# Patient Record
Sex: FEMALE | Race: WHITE
[De-identification: names, ages, dates, MRNs, and addresses within clinical notes are randomized per-mention and may not be internally consistent; named-entity substitution may affect disease eponyms.]

---

## 2018-04-30 ENCOUNTER — HOSPITAL ENCOUNTER (EMERGENCY)
Dept: HOSPITAL 45 - C.EDB | Age: 28
Discharge: HOME | End: 2018-04-30
Payer: COMMERCIAL

## 2018-04-30 VITALS
HEIGHT: 60.98 IN | BODY MASS INDEX: 55.32 KG/M2 | BODY MASS INDEX: 55.32 KG/M2 | WEIGHT: 293 LBS | WEIGHT: 293 LBS | HEIGHT: 60.98 IN

## 2018-04-30 VITALS — DIASTOLIC BLOOD PRESSURE: 79 MMHG | SYSTOLIC BLOOD PRESSURE: 132 MMHG | HEART RATE: 89 BPM | OXYGEN SATURATION: 98 %

## 2018-04-30 VITALS — TEMPERATURE: 98.06 F

## 2018-04-30 DIAGNOSIS — F31.9: ICD-10-CM

## 2018-04-30 DIAGNOSIS — F17.210: ICD-10-CM

## 2018-04-30 DIAGNOSIS — Z82.49: ICD-10-CM

## 2018-04-30 DIAGNOSIS — Z83.3: ICD-10-CM

## 2018-04-30 DIAGNOSIS — Z80.9: ICD-10-CM

## 2018-04-30 DIAGNOSIS — E66.01: ICD-10-CM

## 2018-04-30 DIAGNOSIS — S52.502B: Primary | ICD-10-CM

## 2018-04-30 DIAGNOSIS — Z79.899: ICD-10-CM

## 2018-04-30 DIAGNOSIS — Z90.49: ICD-10-CM
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
## 2018-04-30 NOTE — DIAGNOSTIC IMAGING REPORT
L HAND MIN 3 VIEWS ROUTINE



CLINICAL HISTORY: Dog bites/L wrist/hand pain trauma. Pain.



COMPARISON: None.



DISCUSSION: Focal fracture distal radial metaphysis remaining osseous structures

are intact. Generalized soft tissue edema and disruption.    



IMPRESSION: 

1. Generalized soft tissue edema/disruption.





2. Focal nondisplaced cortical fracture distal radial metaphysis











The above report was generated using voice recognition software.  It may contain

grammatical, syntax or spelling errors.







Electronically signed by:  Adriano Santiago M.D.

4/30/2018 8:07 PM



Dictated Date/Time:  4/30/2018 8:05 PM

## 2018-04-30 NOTE — EMERGENCY ROOM VISIT NOTE
History


First contact with patient:  18:59


Chief Complaint:  BITE


Stated Complaint:  BITE DOG





History of Present Illness


The patient is a 27 year old female who presents to the Emergency Room with 

complaints of a dog bite to the left wrist/hand region.  The injury happened 

approximately 1 hour ago.  The patient reports that she cannot move the wrist 

or fingers because of pain.  She rates her discomfort a 10 out of 10.  She 

reports that the dog belongs to a friend of her friend.  It is a pit bull, and 

has been aggressive in the past.  They did try to contact the owner to see if 

the dog was up-to-date on its shots, but was unsuccessful.  The patient's 

tetanus immunization is up-to-date.  The patient is right-hand dominant.





Review of Systems


HEENT:  Denies dizziness, visual problems, hearing loss, tinnitus.  Denies 

difficulty swallowing or oral lesions.


PULMONARY: Denies cough, shortness of breath, sputum production or hemoptysis.


CARDIOVASCULAR:  Denies chest pain, palpitations, dyspnea on exertion, 

orthopnea or peripheral edema.


GASTROINTESTINAL:  Denies diarrhea, constipation, nausea, vomiting, or 

abdominal pain.


GENITOURINARY:  Denies dysuria, frequency, urgency or nocturia.


NEUROLOGIC:  Denies history of epilepsy, CVA, TIA or chronic headaches.


MUSCULOSKELETAL: Denies history of joint tenderness/swelling.


SKIN:  Denies rashes or lesions.


PSYCHIATRIC: Denies history of depression or mental illness.


ENDOCRINE:  Denies history of diabetes or thyroid disorders.





Past Medical/Surgical History


Medical Problems:


(1) Biliary colic


(2) Bipolar disorder


Surgical Problems:


(1) History of cholecystectomy








Family History





Cancer


Diabetes mellitus


FH: heart disease


Hypertension





Social History


Smoking Status:  Current Every Day Smoker


Alcohol Use:  none


Drug Use:  none


Marital Status:  in relationship


Housing Status:  lives with significant other


Occupation Status:  employed





Current/Historical Medications


Scheduled


Ciprofloxacin Hcl (Cipro), 500 MG PO BID


Clindamycin Hcl (Cleocin), 300 MG PO TID


Omeprazole (Prilosec), 20 MG PO DAILY





Scheduled PRN


Oxycodone Ir (Roxicodone Ir), 1-2 TAB PO Q4H PRN for Pain





Miscellaneous Medications


Hydroxyzine HCl (Hydroxyzine HCl)





Physical Exam


Vital Signs











  Date Time  Temp Pulse Resp B/P (MAP) Pulse Ox O2 Delivery O2 Flow Rate FiO2


 


4/30/18 21:32  89 19 132/79 98   


 


4/30/18 20:12  86 20 134/71 98 Room Air  


 


4/30/18 18:55 36.7 93 24 146/98 98 Room Air  











Physical Exam


CONSTITUTIONAL: Morbidly obese female, alert and oriented X 3 with positive 

affect.  Patient is crying, requesting Arsh for pain.


HEENT:  Normocephalic, atraumatic.  Pupils equal, round and reactive.  


NECK:  Full active range of motion without discomfort.


RESPIRATORY:  Clear to auscultation bilaterally with no wheezing, crackles, 

rhonchi or stridor.


CARDIOVASCULAR:  Regular rate and rhythm with no murmurs, rubs or gallops.


GASTROINTESTINAL:  Bowel sounds present in all quadrants.  


MUSCULOSKELETAL: The patient initially refused any examination until she got 

something for the pain.  After she was administered parenteral analgesics, the 

dressing was removed from the left wrist and hand region to show several 

puncture wounds.  There are no lacerations that will require gross 

approximation with suturing.  The patient has discomfort with any range of 

motion of the wrist and fingers.  Moderate edema is noted.  Capillary refill of 

the fingers is less than 2 seconds.


INTEGUMENTARY:  No rash or other significant dermatologic conditions noted.


NEUROLOGIC: Left hand and fingers are sensory intact.





Medical Decision & Procedures


ER Provider


Diagnostic Interpretation:


My interpretation of left wrist and hand x-rays shows a focal cortical fracture 

of the distal dorsal radius.  Radiologist report is as follows:





L WRIST MIN 3 VIEWS ROUTINE





CLINICAL HISTORY: Dog bites/L wrist/hand pain trauma. Pain.





COMPARISON: None.





DISCUSSION: A focal cortical fracture distal radial metaphysis. The remaining


osseous structures of the wrist are unremarkable. Generalized soft tissue edema


potential small minimally radiopaque density dorsal to the distal radius which


may represent a small avulsion versus small foreign body.    





IMPRESSION: 


1. Focal fracture distal radial metaphysis.


2. Potential tiny avulsion of the dorsal to the distal radius.








3. Generalized soft tissue edema and disruption.





Medications Administered











 Medications


  (Trade)  Dose


 Ordered  Sig/Sue


 Route  Start Time


 Stop Time Status Last Admin


Dose Admin


 


 Fentanyl Citrate


  (Fentanyl Inj)  100 mcg  NOW  ONCE


 IV  4/30/18 19:15


 4/30/18 19:16 DC 4/30/18 19:14


100 MCG


 


 Ketorolac


 Tromethamine


  (Toradol Inj)  30 mg  NOW  STAT


 IV  4/30/18 19:05


 4/30/18 19:06 DC 4/30/18 19:15


30 MG


 


 Ondansetron HCl


  (Zofran Inj)  4 mg  NOW  STAT


 IV  4/30/18 19:05


 4/30/18 19:06 DC 4/30/18 19:15


4 MG


 


 Morphine Sulfate


  (MoRPHine


 SULFATE INJ)  8 mg  NOW  STAT


 IV  4/30/18 19:40


 4/30/18 19:42 DC 4/30/18 20:08


8 MG


 


 Clindamycin


 Phosphate 600 mg/


 Dextrose  54 ml @ 


 100 mls/hr  ONE  ONCE


 IV  4/30/18 19:45


 4/30/18 20:17 DC 4/30/18 20:38


100 MLS/HR


 


 Ciprofloxacin


  (Cipro Tab)  500 mg  NOW  STAT


 PO  4/30/18 19:44


 4/30/18 19:53 DC 4/30/18 20:08


500 MG


 


 Ondansetron HCl


  (Zofran Inj)  4 mg  NOW  STAT


 IV  4/30/18 20:54


 4/30/18 20:55 DC 4/30/18 21:02


4 MG


 


 Clindamycin HCl


  (Cleocin 150MG


 Home Pack)  1 homepack  UD  ONCE


 PO  4/30/18 21:00


 4/30/18 21:01 DC 4/30/18 21:23


1 HOMEPACK


 


 Ciprofloxacin


  (Cipro 500MG


 Home Pack)  1 homepack  UD  ONCE


 PO  4/30/18 21:00


 4/30/18 21:01 DC 4/30/18 21:23


1 HOMEPACK


 


 Oxycodone HCl


  (Roxicodone


 Immediate Rel 5MG


 Home Pack)  1 homepack  UD  ONCE


 PO  4/30/18 21:00


 4/30/18 21:01 DC 4/30/18 21:23


1 HOMEPACK











Procedure








ED Course


Patient history and physical exam were performed.  Nurse's notes were reviewed.

  Vital signs were reviewed, showing an elevated blood pressure of 146/98.  The 

patient was crying from pain, and refused any examination until analgesics 

could be provided.  IV access was established, and the patient was administered 

fentanyl 100 mcg, Toradol 30 mg and Zofran 4 mg IVP.  This reduced her pain to 

a 6 out of 10.  The dressing was removed to show several puncture wounds.  The 

wounds were cleansed, irrigated and wrapped with bacitracin dressings.  This 

procedure increased her pain back to a 10 out of 10.  At this point, she was 

administered morphine 8 mg, along with clindamycin 600 mg IV infusion and Cipro 

500 mg orally.  The patient did require an additional dose of Zofran 4 mg IVP 

for nausea.  X-rays of the left wrist and hand shows a focal cortical fracture 

of the distal radius.  A volar Ortho-Glass splint was applied with 

neurovascular check normal after placement.  The patient was provided contact 

information for Dr. Rehman, hand surgeon with [facility].  The 

patient was instructed to call his office tomorrow for an appointment.  The 

patient will be provided prescriptions for OxyIR, clindamycin and 

ciprofloxacin.  The patient was happy with plan of care, and rated her 

discomfort a 3 out of 10 at the time of discharge.





We did discuss immunization status of the dog.  The family again reports that 

they were unable to contact the owner.  I did discuss contacting police to 

assist with this matter, and the patient was in agreement.  The daughter, 

however, was able to contact the owner who reports that she would provide 

documentation of immunization status, and pay for her ED treatment.  The 

patient deferred request for us contacting police for her.  I think this is 

reasonable, as long as documentation shows that the dog is up-to-date on its 

rabies immunizations.  The patient was instructed to return if the dog is not 

up to date.





Medical Decision








[place] Drug Monitoring Program


Search Results:  patient reviewed within database, no issues identified





Medication Reconcilliation


Current Medication List:  was personally reviewed by me





Blood Pressure Screening


Patient's blood pressure:  Normal blood pressure





Impression





 Primary Impression:  


 Open fracture of left distal radius


 Additional Impression:  


 Dog bite of left arm





Departure Information


Prescriptions





Oxycodone Ir (Roxicodone Ir) 5 Mg Tab


1-2 TAB PO Q4H Y for Pain, #24 TAB


   For Initial Treatment


   Prov: Sam Deal PA         4/30/18 


Ciprofloxacin Hcl (CIPRO) 500 Mg Tab


500 MG PO BID for 7 Days, #14 TAB


   Prov: Sam Deal PA         4/30/18 


Clindamycin Hcl (CLEOCIN) 300 Mg Cap


300 MG PO TID for 7 Days, #21 CAP


   Prov: Sam Deal PA         4/30/18





Referrals


Will Farley M.D. (PCP)





Patient Instructions


My [facility]





Problem Qualifiers








 Primary Impression:  


 Open fracture of left distal radius


 Encounter type:  initial encounter  Fracture morphology:  other fracture

## 2018-04-30 NOTE — DIAGNOSTIC IMAGING REPORT
L WRIST MIN 3 VIEWS ROUTINE



CLINICAL HISTORY: Dog bites/L wrist/hand pain trauma. Pain.



COMPARISON: None.



DISCUSSION: A focal cortical fracture distal radial metaphysis. The remaining

osseous structures of the wrist are unremarkable. Generalized soft tissue edema

potential small minimally radiopaque density dorsal to the distal radius which

may represent a small avulsion versus small foreign body.    



IMPRESSION: 

1. Focal fracture distal radial metaphysis.

2. Potential tiny avulsion of the dorsal to the distal radius.





3. Generalized soft tissue edema and disruption.











The above report was generated using voice recognition software.  It may contain

grammatical, syntax or spelling errors.







Electronically signed by:  Adriano Santiago M.D.

4/30/2018 8:08 PM



Dictated Date/Time:  4/30/2018 8:07 PM